# Patient Record
Sex: MALE | Race: WHITE | NOT HISPANIC OR LATINO | Employment: FULL TIME | URBAN - NONMETROPOLITAN AREA
[De-identification: names, ages, dates, MRNs, and addresses within clinical notes are randomized per-mention and may not be internally consistent; named-entity substitution may affect disease eponyms.]

---

## 2019-08-07 ENCOUNTER — TRANSCRIBE ORDERS (OUTPATIENT)
Dept: OCCUPATIONAL THERAPY | Facility: HOSPITAL | Age: 29
End: 2019-08-07

## 2019-08-07 DIAGNOSIS — Z98.890 HISTORY OF CARPAL TUNNEL RELEASE: Primary | ICD-10-CM

## 2019-08-09 ENCOUNTER — HOSPITAL ENCOUNTER (OUTPATIENT)
Dept: OCCUPATIONAL THERAPY | Facility: HOSPITAL | Age: 29
Setting detail: THERAPIES SERIES
Discharge: HOME | End: 2019-08-09

## 2019-08-09 DIAGNOSIS — R29.898 LEFT HAND WEAKNESS: ICD-10-CM

## 2019-08-09 DIAGNOSIS — Z98.890 S/P CARPAL TUNNEL RELEASE: Primary | ICD-10-CM

## 2019-08-09 PROCEDURE — 97166 OT EVAL MOD COMPLEX 45 MIN: CPT | Performed by: OCCUPATIONAL THERAPIST

## 2019-08-09 NOTE — THERAPY EVALUATION
Outpatient Occupational Therapy Hand Initial Evaluation   [facility]     Patient Name: Juventino Steven  : 1990  MRN: [id number]  Today's Date: 2019       Visit Date: 2019    Patient Active Problem List   Diagnosis   • Episodic paroxysmal hemicrania, not intractable        Past Medical History:   Diagnosis Date   • Anxiety    • Decreased libido    • Depression    • GERD (gastroesophageal reflux disease)    • Muscle, ligament, or fascia disorder    • Tobacco abuse disorder         Past Surgical History:   Procedure Laterality Date   • ADENOIDECTOMY     • LIPOMA EXCISION     • TONSILLECTOMY           Visit Dx:    ICD-10-CM ICD-9-CM   1. S/P carpal tunnel release Z98.890 V45.89   2. Left hand weakness R29.898 728.87       Patient History     Row Name 19 1100             History    Chief Complaint  Difficulty with daily activities;Muscle weakness;Pain  -TR      Type of Pain  Hand pain  -TR      Date Current Problem(s) Began  19  -TR      Brief Description of Current Complaint  Pt reports having carpal tunnel symptoms at the L UE for the past 5 years. He had carpal tunnel release at the L hand on  and was referred to OT due to weakness, pain and limited use of the L hand. He will be off work for at least 4 more weeks per his surgeon.   -TR      Previous treatment for THIS PROBLEM  Surgery  -TR      Surgery Date:  19  -TR      Patient/Caregiver Goals  Relieve pain;Return to work;Return to prior level of function;Improve strength;Know what to do to help the symptoms  -TR      Current Tobacco Use  No  -TR      Smoking Status  No  -TR      Patient's Rating of General Health  Fair  -TR      Hand Dominance  right-handed  -TR      Occupation/sports/leisure activities  /.  -TR      Patient seeing anyone else for problem(s)?  No  -TR      How has patient tried to help current problem?  AROM of the L hand.  -TR      What clinical tests have you had for this problem?  Nerve Conduction  "Test  -TR      Results of Clinical Tests  Carpal tunnel at B UE. Moderate damage of the L and slight damage to the R UE.   -TR      History of Previous Related Injuries  Recently had injections at the R UE for carpal tunnel.   -TR         Pain     Pain Location  Hand  -TR      Pain at Present  4  -TR      Pain at Best  2  -TR      Pain at Worst  8  -TR      Pain Frequency  Constant/continuous  -TR      Pain Description  Sharp;Burning Feels like \"hot water\" on L hand per pt.   -TR      What Performance Factors Make the Current Problem(s) WORSE?  Use of the L UE.  -TR      What Performance Factors Make the Current Problem(s) BETTER?  Rest.   -TR      Is your sleep disturbed?  No  -TR      Is medication used to assist with sleep?  No  -TR      What position do you sleep in?  Supine  -TR      Difficulties at work?  Off work for 4 more weeks.  -TR      Difficulties with ADL's?  Difficulty with FM aspects.   -TR      Difficulties with recreational activities?  Unable to perform heavy tasks.   -TR         Fall Risk Assessment    Any falls in the past year:  No  -TR      Does patient have a fear of falling  No  -TR         Services    Prior Rehab/Home Health Experiences  No  -TR      Are you currently receiving Home Health services  No  -TR      Do you plan to receive Home Health services in the near future  No  -TR         Daily Activities    Primary Language  English  -TR      How does patient learn best?  Listening  -TR      Teaching needs identified  Home Exercise Program;Management of Condition  -TR      Patient is concerned about/has problems with  Coordination;Difficulty with self care (i.e. bathing, dressing, toileting:;Flexibility;Grasping objects lifting;Performing home management (household chores, shopping, care of dependents);Performing job responsibilities/community activities (work, school,;Performing sports, recreation, and play activities;Repetitive movements of the hand, arm, shoulder;Writing/grasping " items with hand(s)  -TR      Does patient have problems with the following?  None  -TR      Barriers to learning  None  -TR      Pt Participated in POC and Goals  Yes  -TR         Safety    Are you being hurt, hit, or frightened by anyone at home or in your life?  No  -TR      Are you being neglected by a caregiver  No  -TR        User Key  (r) = Recorded By, (t) = Taken By, (c) = Cosigned By    Initials Name Provider Type    TR Erlinda Cottrell OTR/L Occupational Therapist             Hand Therapy (last 24 hours)      Hand Eval     Row Name 08/09/19 1100             Subjective Pain    Able to rate subjective pain?  yes  -TR      Pre-Treatment Pain Level  4  -TR      Post-Treatment Pain Level  5  -TR      Subjective Pain Comment  L hand and wrist.   -TR         Splint Form    Splint Type  -- Pt owns a wrist brace, currently not using.   -TR         Hand ROM Tested?    Hand ROM Tested?  -- L hand finger flexion WNL. Extension 5% impaired.   -TR         Hand  Strength     Strength Affected Side  Bilateral  -TR          Strength Right    Right Rung  2  -TR      Right  Test 1  110  -TR      Right  Test 2  119  -TR       Strength Average Right  114.5  -TR          Strength Left    Left Rung  2  -TR      Left  Test 1  17  -TR      Left  Test 2  16  -TR       Strength Average Left  16.5  -TR         Pinch Strength    Affected Side  Bilateral  -TR         Right Hand Strength - Pinch (lbs)    Lateral  16 lbs  -TR      Three Jaw Jose A  15 lbs  -TR      Tip Pinch - Index Finger  12 lbs  -TR         Left Hand Strength - Pinch (lbs)    Lateral  9 lbs  -TR      Three Jaw Jose A  6 lbs  -TR      Tip Pinch - Index Finger  3 lbs  -TR        User Key  (r) = Recorded By, (t) = Taken By, (c) = Cosigned By    Initials Name Provider Type    Erlinda Linton OTR/L Occupational Therapist          OT Neuro     Row Name 08/09/19 1100             Home Living    Living Arrangements  apartment   -TR         Vision- Basic    Current Vision  No visual deficits  -TR         Cognitive Assessment/Intervention    Current Cognitive/Communication Assessment  functional  -TR      Orientation Status (Cognition)  oriented x 4  -TR      Follows Commands (Cognition)  WNL  -TR         Sensation    Additional Comments  Slight numbness reported at surgical site. No other concerns.   -TR         Posture/Observations    Posture- WNL  Posture is WNL  -TR         Coordination    Other Coordination Observations  L hand opposition WFL.   -TR         General ROM    RT Upper Ext  Rt Wrist Flexion;Rt Wrist Extension  -TR      LT Upper Ext  Lt Wrist Flexion;Lt Wrist Extension  -TR         Right Upper Ext    Rt Wrist Flexion AROM  77  -TR      Rt Wrist Extension AROM  67  -TR         Left Upper Ext    Lt Wrist Flexion AROM  46  -TR      Lt Wrist Extension AROM  48  -TR         MMT (Manual Muscle Testing)    General MMT Comments  5/5 except L wrist 3-/5.   -TR         Functional Mobility    Functional Mobility- Ind. Level  independent  -TR         ADL Assessment/Intervention    ADL's Assessed?  Grooming;Lower Body Dressing  -TR      Comment, IADL Assessment/Training  Currently not working or performing heavy IADL tasks. Mod I for light and moderate IADL tasks.   -TR      Additional Documentation  Comment, IADL Assessment/Training (Row)  -TR         Lower Body Dressing Assessment/Training    Comment (Lower Body Dressing)  Difficulty with clothing fasteners and use of L hand for completion.   -TR         Grooming Assessment/Training    Comment (Grooming)  Difficulty opening containers and using L hand for FM aspects.   -TR        User Key  (r) = Recorded By, (t) = Taken By, (c) = Cosigned By    Initials Name Provider Type    TR Erlinda Cottrell, OTR/L Occupational Therapist              Therapy Education  Education Details: L hand tendon glides, median nerve glides and light resistance  and pinch strengthening using a ball.    Given: HEP, Symptoms/condition management, Pain management, Posture/body mechanics  Program: New  How Provided: Verbal, Demonstration, Written  Provided to: Patient  Level of Understanding: Verbalized, Demonstrated      OT Goals     Row Name 08/09/19 1100          OT Short Term Goals    STG Date to Achieve  09/08/19  -TR     STG 1  Pt will be independent with daily completion of a HEP to address L hand and wrist deficits.   -TR     STG 1 Progress  New  -TR     STG 2  Pt will display improved L hand coordination by completing the 9 hole peg assessment in 19 seconds or less.   -TR     STG 2 Progress  New  -TR     STG 3  Pt will open bottles, jars and containers with Modified [place] and no L UE pain to return to his PLOF.   -TR     STG 3 Progress  New  -TR        Long Term Goals    LTG Date to Achieve  09/08/19  -TR     LTG 1  Pt will increase L hand  strength to 80# to return to his PLOF with all IADL and work related tasks.   -TR     LTG 1 Progress  New  -TR     LTG 2  Pt will improve L wrist active flexion and extension to 60 degrees with 0/10 pain for improved use of the L UE during work tasks.   -TR     LTG 2 Progress  New  -TR     LTG 3  Pt will display independence with completion of scar massage and joint protection techniques for the L wrist and hand.   -TR     LTG 3 Progress  New  -TR        Time Calculation    OT Goal Re-Cert Due Date  09/08/19  -TR       User Key  (r) = Recorded By, (t) = Taken By, (c) = Cosigned By    Initials Name Provider Type    TR Erlinda Cottrell, OTR/L Occupational Therapist          OT Assessment/Plan     Row Name 08/09/19 1100          OT Assessment    Functional Limitations  Performance in work activities;Performance in self-care ADL;Performance in leisure activities;Limitations in functional capacity and performance;Limitation in home management  -TR     Impairments  Coordination;Dexterity;Endurance;Range of motion;Sensation;Pain;Muscle strength;Joint  mobility;Impaired muscle power;Impaired muscle endurance;Impaired flexibility  -TR     Assessment Comments  This pt is 2 weeks s/p L carpal tunnel release. His incision site is almost closed and he shows no signs of edema at the L hand. L finger extension is slightly impaired and ROM deficits are noted for wrist flexion and extension. L hand  strength is greatly impaired and the pt is currently unable to work due to his deficits. He has difficulty with use of the L hand during ADL tasks and most IADL. Skilled OT is necessary to address L UE deficits for this pt to regain full use of the L hand and return to work with no limitations.   -TR     Please refer to paper survey for additional self-reported information  Yes  -TR     OT Diagnosis  S/P Carpal Tunnel Release, L hand weakness.   -TR     OT Rehab Potential  Good  -TR     Patient/caregiver participated in establishment of treatment plan and goals  Yes  -TR     Patient would benefit from skilled therapy intervention  Yes  -TR        OT Plan    OT Frequency  1x/week;2x/week 1-2x/week   -TR     Predicted Duration of Therapy Intervention (Therapy Eval)  6 weeks  -TR     Planned CPT's?  OT EVAL MOD COMPLEXITY: 74021;OT ELECTRIC STIM ATTD EA 15 MIN: 78789;OT ULTRASOUND EA 15 MIN: 05799;OT SELF CARE/MGMT/TRAIN 15 MIN: 05450;OT THER PROC EA 15 MIN: 62298UZ;OT THER ACT EA 15 MIN: 28525NX;OT ORTHO/PROSTHET CHECKOUT EA 15 MIN: 55333;OT ORTHOTIC MGMT/TRAIN EA 15 MIN: 02716  -TR     Planned Therapy Interventions (Optional Details)  home exercise program;stretching;strengthening;ROM (Range of Motion);patient/family education;orthotic fitting/training;motor coordination training  -TR     OT Plan Comments  Plan to focus on ROM deficits and pain, then progress to strengthening and tolerance of work tasks.   -TR       User Key  (r) = Recorded By, (t) = Taken By, (c) = Cosigned By    Initials Name Provider Type    TR Erlinda Cottrell, OTR/L Occupational Therapist               Outcome Measure Options: 9 Hole Peg, Quick DASH  9 Hole Peg  9-Hole Peg Left: 21.9  9-Hole Peg Right: 16.2  Quick DASH  Open a tight or new jar.: Unable  Do heavy household chores (e.g., wash walls, wash floors): Mild Difficulty  Carry a shopping bag or briefcase: Severe Difficulty  Wash your back: No Difficulty  Use a knife to cut food: Mild Difficulty  Recreational activities in which you take some force or impact through your arm, should or hand (e.g. golf, hammering, tennis, etc.): Severe Difficulty  During the past week, to what extent has your arm, shoulder, or hand problem interfered with your normal social activites with family, friends, neighbors or groups?: Extremely  During the past week, were you limited in your work or other regular daily activities as a result of your arm, shoulder or hand problem?: Very limited  Arm, Shoulder, or hand pain: Mild  Tingling (pins and needles) in your arm, shoulder, or hand: Mild  During the past week, how much difficulty have you had sleeping because of the pain in your arm, shoulder or hand?: No difficulty  Number of Questions Answered: 11  Quick DASH Score: 47.73         Time Calculation:   OT Start Time: 1015  OT Stop Time: 1109  OT Time Calculation (min): 54 min     Therapy Charges for Today     Code Description Service Date Service Provider Modifiers Qty    96153270081  OT EVAL MOD COMPLEXITY 4 8/9/2019 Erlinda Cottrell OTR/L GO 1                 MACARIO Boland/LISS  8/9/2019

## 2019-08-12 ENCOUNTER — HOSPITAL ENCOUNTER (OUTPATIENT)
Dept: OCCUPATIONAL THERAPY | Facility: HOSPITAL | Age: 29
Setting detail: THERAPIES SERIES
Discharge: HOME | End: 2019-08-12

## 2019-08-12 DIAGNOSIS — R29.898 LEFT HAND WEAKNESS: ICD-10-CM

## 2019-08-12 DIAGNOSIS — Z98.890 S/P CARPAL TUNNEL RELEASE: Primary | ICD-10-CM

## 2019-08-12 PROCEDURE — 97530 THERAPEUTIC ACTIVITIES: CPT | Performed by: OCCUPATIONAL THERAPIST

## 2019-08-12 PROCEDURE — 97032 APPL MODALITY 1+ESTIM EA 15: CPT | Performed by: OCCUPATIONAL THERAPIST

## 2019-08-12 NOTE — THERAPY TREATMENT NOTE
Outpatient Occupational Therapy Hand Treatment Note  [facility]     Patient Name: Juventino Steven  : 1990  MRN: [id number]  Today's Date: 2019         Visit Date: 2019  Patient Active Problem List   Diagnosis   • Episodic paroxysmal hemicrania, not intractable         Visit Dx:    ICD-10-CM ICD-9-CM   1. S/P carpal tunnel release Z98.890 V45.89   2. Left hand weakness R29.898 728.87                   OT Assessment/Plan     Row Name 19 1200          OT Assessment    Assessment Comments  Pt made improvements with L hand and wrist pain following the use of laserstim, but pain increased with activity. L hand coordination remains impaired and weakness is noted during functional tasks. Incision site has almost closed. Will address scar massage at next visit.   -TR        OT Plan    OT Plan Comments  Plan to complete scar massage and address L hand pain.   -TR       User Key  (r) = Recorded By, (t) = Taken By, (c) = Cosigned By    Initials Name Provider Type    Erlinda Linton, OTR/L Occupational Therapist          Modalities     Row Name 19 1100             ELECTRICAL STIMULATION    Attended/Unattended  Attended  -TR      Stimulation Type  -- Laserstim  -TR      Max mAmp  -- acute pain setting.   -TR      Location/Electrode Placement/Other  Over L wrist surgical site and palmar aspect of L MP joints.   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 Advised pt to only complete light weight activities with L hand and wrist at this time.  -TR         Subjective Pain    Able to rate subjective pain?  yes  -TR      Pre-Treatment Pain Level  6  -TR       Post-Treatment Pain Level  6  -TR      Subjective Pain Comment  L hand and wrist.   -TR         Exercise 1    Exercise Name 1  Reviewed L hand tendon glides and nerve glides with only min cues required for proper technique. Desensitization exercises added to HEP for L wrist surgical site. Light strengthening completed with L hand using Min to Mod resistance clothes pins 20 reps x 2 sets. L wrist PROM with sustained stretch completed for flexion and extension. Purdue pegboard completed with L hand with min droppaged. Educated pt on use of a pencil and coins for L in-hand manipulation skills.   -TR        User Key  (r) = Recorded By, (t) = Taken By, (c) = Cosigned By    Initials Name Provider Type    TR Erlinda Cottrell, OTR/L Occupational Therapist          OT Goals     Row Name 08/12/19 1200          OT Short Term Goals    STG Date to Achieve  09/08/19  -TR     STG 1  Pt will be independent with daily completion of a HEP to address L hand and wrist deficits.   -TR     STG 1 Progress  Ongoing  -TR     STG 1 Progress Comments  Reviewed nerve and tendon glides. Added desensitization exercises.   -TR     STG 2  Pt will display improved L hand coordination by completing the 9 hole peg assessment in 19 seconds or less.   -TR     STG 2 Progress  Ongoing  -TR     STG 2 Progress Comments  Min droppage with the Purdue Pegboard.   -TR     STG 3  Pt will open bottles, jars and containers with Modified [place] and no L UE pain to return to his PLOF.   -TR     STG 3 Progress  Ongoing  -TR        Long Term Goals    LTG Date to Achieve  09/08/19  -TR     LTG 1  Pt will increase L hand  strength to 80# to return to his PLOF with all IADL and work related tasks.   -TR     LTG 1 Progress  Ongoing  -TR     LTG 2  Pt will improve L wrist active flexion and extension to 60 degrees with 0/10 pain for improved use of the L UE during work tasks.   -TR     LTG 2 Progress  Ongoing  -TR     LTG 2 Progress Comments  Passive  stretching completed.   -TR     LTG 3  Pt will display independence with completion of scar massage and joint protection techniques for the L wrist and hand.   -TR     LTG 3 Progress  Ongoing  -TR     LTG 3 Progress Comments  Incision site has not fully closed. Will address at next visit.   -TR        Time Calculation    OT Goal Re-Cert Due Date  09/08/19  -TR       User Key  (r) = Recorded By, (t) = Taken By, (c) = Cosigned By    Initials Name Provider Type    TR Erlinda Cottrell OTR/L Occupational Therapist          Therapy Education  Education Details: Reviewed nerve and tendon glides. Added desensitization and in-hand manipulation skills to HEP.   Given: HEP, Symptoms/condition management, Pain management  Program: Progressed  How Provided: Verbal, Demonstration, Written  Provided to: Patient  Level of Understanding: Verbalized, Demonstrated               Time Calculation:   OT Start Time: 1104  OT Stop Time: 1143  OT Time Calculation (min): 39 min  Total Timed Code Minutes- OT: 39 minute(s)     Therapy Charges for Today     Code Description Service Date Service Provider Modifiers Qty    04699587396  OT ELEC STIM EA-PER 15 MIN 8/12/2019 Erlinda Cottrell OTR/L GO 1    [id number]  OT THERAPEUTIC ACT EA 15 MIN 8/12/2019 Erlinda Cottrell OTR/LISS GO 2                  Erlinda PORTER. MERCEDES Cottrell  8/12/2019

## 2019-08-19 ENCOUNTER — HOSPITAL ENCOUNTER (OUTPATIENT)
Dept: OCCUPATIONAL THERAPY | Facility: HOSPITAL | Age: 29
Setting detail: THERAPIES SERIES
Discharge: HOME | End: 2019-08-19

## 2019-08-19 DIAGNOSIS — R29.898 LEFT HAND WEAKNESS: ICD-10-CM

## 2019-08-19 DIAGNOSIS — Z98.890 S/P CARPAL TUNNEL RELEASE: Primary | ICD-10-CM

## 2019-08-19 PROCEDURE — 97032 APPL MODALITY 1+ESTIM EA 15: CPT | Performed by: OCCUPATIONAL THERAPIST

## 2019-08-19 PROCEDURE — 97110 THERAPEUTIC EXERCISES: CPT | Performed by: OCCUPATIONAL THERAPIST

## 2019-08-19 NOTE — THERAPY TREATMENT NOTE
Outpatient Occupational Therapy Hand Treatment Note  [facility]     Patient Name: Juventino Steven  : 1990  MRN: [id number]  Today's Date: 2019         Visit Date: 2019  Patient Active Problem List   Diagnosis   • Episodic paroxysmal hemicrania, not intractable         Visit Dx:    ICD-10-CM ICD-9-CM   1. S/P carpal tunnel release Z98.890 V45.89   2. Left hand weakness R29.898 728.87          Hand Therapy (last 24 hours)      Hand Eval     Row Name 19 1100              Strength Left    # Reps  -- 9/10 pain reported with testing.   -TR      Left Rung  2  -TR      Left  Test 1  41  -TR      Left  Test 2  42  -TR      Left  Test 3  51  -TR       Strength Average Left  44.67  -TR         Left Hand Strength - Pinch (lbs)    Lateral  13 lbs  -TR      Three Jaw Jose A  9 lbs  -TR      Tip Pinch - Index Finger  8 lbs  -TR        User Key  (r) = Recorded By, (t) = Taken By, (c) = Cosigned By    Initials Name Provider Type    Erlinda Linton, OTR/L Occupational Therapist                  OT Assessment/Plan     Row Name 19 1100          OT Assessment    Assessment Comments  Pt met his coordination goal today with the 9 hole peg assessment. His L hand  and pinch strength continues to increase. Pain is improving, but increases with gripping with the L hand. No concerns with HEP. Dry skin noted around surgical site, so massage was not completed. Will attempt at next session is scar is closed with no concerns.   -TR        OT Plan    OT Plan Comments  Plan to focus on L hand pain, wrist AROM and L hand strength.   -TR       User Key  (r) = Recorded By, (t) = Taken By, (c) = Cosigned By    Initials Name Provider Type    Erlinda Linton, OTR/L Occupational Therapist          Modalities     Row Name 19 1100             ELECTRICAL STIMULATION    Attended/Unattended  Attended  -TR      Stimulation Type  -- Laserstim  -TR      Max mAmp  -- Acute pain setting.   -TR       Location/Electrode Placement/Other  Over L wrist surgical site and palmar aspect of L MP joints.   -TR      84909 - OT E-Stim Attended (Manual) Minutes  15  -TR        User Key  (r) = Recorded By, (t) = Taken By, (c) = Cosigned By    Initials Name Provider Type    Erlinda Linton OTR/L Occupational Therapist        OT Exercises     Row Name 08/19/19 1100             Subjective Comments    Subjective Comments  Pt reports daily completion of his HEP and only doing light housework with his L hand. Pain is slowly improving per pt.   -TR         Subjective Pain    Able to rate subjective pain?  yes  -TR      Pre-Treatment Pain Level  3  -TR      Post-Treatment Pain Level  4  -TR      Subjective Pain Comment  L hand. Sore and feels swollen.   -TR         Exercise 1    Exercise Name 1  7# digi-flex used for L hand  strengthening 20 reps x 1 set. 1.5# digi-flex used for isolated finger flexion strengthening at L hand 20 x 1. 6# of resistance used for L hand finger extension strengthening 20 x 1. Hand exercise web used for L wrist flexion and extension strengthening 20 reps x 1 set. Slight increase in pain with exercise. Scar massage not attempted at surgical site due to dry skin still intact.   -TR        User Key  (r) = Recorded By, (t) = Taken By, (c) = Cosigned By    Initials Name Provider Type    Erlinda Linton, OTR/L Occupational Therapist          OT Goals     Row Name 08/19/19 1100          OT Short Term Goals    STG Date to Achieve  09/08/19  -TR     STG 1  Pt will be independent with daily completion of a HEP to address L hand and wrist deficits.   -TR     STG 1 Progress  Progressing  -TR     STG 1 Progress Comments  Pt reports daily completion. No concerns noted with current HEP.   -TR     STG 2  Pt will display improved L hand coordination by completing the 9 hole peg assessment in 19 seconds or less.   -TR     STG 2 Progress  Met  -TR     STG 2 Progress Comments  18.7 seconds.   -TR     STG 3   Pt will open bottles, jars and containers with Modified [place] and no L UE pain to return to his PLOF.   -TR     STG 3 Progress  Ongoing  -TR     STG 3 Progress Comments  L  strengthening completed.   -TR        Long Term Goals    LTG Date to Achieve  09/08/19  -TR     LTG 1  Pt will increase L hand  strength to 80# to return to his PLOF with all IADL and work related tasks.   -TR     LTG 1 Progress  Progressing  -TR     LTG 1 Progress Comments  Improved to 44#.   -TR     LTG 2  Pt will improve L wrist active flexion and extension to 60 degrees with 0/10 pain for improved use of the L UE during work tasks.   -TR     LTG 2 Progress  Ongoing  -TR     LTG 2 Progress Comments  3/10 pain at rest.   -TR     LTG 3  Pt will display independence with completion of scar massage and joint protection techniques for the L wrist and hand.   -TR     LTG 3 Progress  Ongoing  -TR     LTG 3 Progress Comments  Surgical scar is healing, but not completely closed.   -TR        Time Calculation    OT Goal Re-Cert Due Date  09/08/19  -TR       User Key  (r) = Recorded By, (t) = Taken By, (c) = Cosigned By    Initials Name Provider Type    TR Erlinda Cottrell, OTR/L Occupational Therapist          Therapy Education  Education Details: No concerns noted with tendon and nerve glides. Educated pt on scar massage, but did not complete due to dry skin around the scar.   Given: HEP, Symptoms/condition management  Program: Reinforced  How Provided: Verbal, Demonstration  Provided to: Patient  Level of Understanding: Verbalized, Demonstrated     9 Hole Peg  9-Hole Peg Left: 18.7         Time Calculation:   OT Start Time: 0802  OT Stop Time: 0847  OT Time Calculation (min): 45 min  Total Timed Code Minutes- OT: 45 minute(s)     Therapy Charges for Today     Code Description Service Date Service Provider Modifiers Qty    [id number]  OT THER PROC EA 15 MIN 8/19/2019 Erlinda Cottrell OTR/L GO 2    [id number]  OT ELEC STIM  EA-PER 15 MIN 8/19/2019 Erlinda Cottrell, OTR/L GO 1                  Erlinda PORTER. Simba, OTR/L  8/19/2019

## 2019-08-26 ENCOUNTER — HOSPITAL ENCOUNTER (OUTPATIENT)
Dept: OCCUPATIONAL THERAPY | Facility: HOSPITAL | Age: 29
Setting detail: THERAPIES SERIES
Discharge: HOME | End: 2019-08-26

## 2019-08-26 DIAGNOSIS — Z98.890 S/P CARPAL TUNNEL RELEASE: Primary | ICD-10-CM

## 2019-08-26 DIAGNOSIS — R29.898 LEFT HAND WEAKNESS: ICD-10-CM

## 2019-08-26 PROCEDURE — 97110 THERAPEUTIC EXERCISES: CPT | Performed by: OCCUPATIONAL THERAPIST

## 2019-08-26 PROCEDURE — 97032 APPL MODALITY 1+ESTIM EA 15: CPT | Performed by: OCCUPATIONAL THERAPIST

## 2019-08-26 PROCEDURE — 97530 THERAPEUTIC ACTIVITIES: CPT | Performed by: OCCUPATIONAL THERAPIST

## 2019-08-26 NOTE — THERAPY TREATMENT NOTE
Outpatient Occupational Therapy Hand Treatment Note  [facility]     Patient Name: Juventino Steven  : 1990  MRN: [id number]  Today's Date: 2019         Visit Date: 2019  Patient Active Problem List   Diagnosis   • Episodic paroxysmal hemicrania, not intractable         Visit Dx:    ICD-10-CM ICD-9-CM   1. S/P carpal tunnel release Z98.890 V45.89   2. Left hand weakness R29.898 728.87                   OT Assessment/Plan     Row Name 19 1000          OT Assessment    Assessment Comments  Pt reports pain 6/10 pre and post tx. pt has notable grimmacing with tasks and sensitivity at incision site. Pt reports no increase in pain, educated on importance of accurate pain reporting. Scar massage and laser stim txs completed at surgical site, with no reports of pain at other sites. Pt completed nerve gliding and light strengthening/opposition activities with verbal cues. Pt reports overuse this weekend with notable swelling, still some swelling to note this date. Pt is eager to return to work.  -MM        OT Plan    OT Plan Comments  Plan to complete scar massage and ROM, while managing pain.  -MM       User Key  (r) = Recorded By, (t) = Taken By, (c) = Cosigned By    Initials Name Provider Type    Iam Sanchez, OTR/L Occupational Therapist          Modalities     Row Name 19 1007             ELECTRICAL STIMULATION    Attended/Unattended  Attended  -MM      Stimulation Type  -- laserstim  -MM      Max mAmp  -- acute pain setting  -MM      Location/Electrode Placement/Other  Over L wrist surgical site  -MM      29611 - OT E-Stim Attended (Manual) Minutes  10  -MM        User Key  (r) = Recorded By, (t) = Taken By, (c) = Cosigned By    Initials Name Provider Type    Iam Sanchez, OTR/L Occupational Therapist        OT Exercises     Row Name 19 1007             Subjective Comments    Subjective Comments  Pt reports follow through with HEP. Pt reports overworking L hand this  weekend with notable swelling this weekend. Pt reports swelling is still present but improved from weekend.  -MM         Subjective Pain    Able to rate subjective pain?  yes  -MM      Pre-Treatment Pain Level  6  -MM      Post-Treatment Pain Level  6  -MM      Subjective Pain Comment  L hand. Sore and swollen. Grimmaces throughout session. Discussed importance of accurately reporting pain.  -MM         Functional Mobility    Functional Mobility- Ind. Level  independent  -MM         Total Minutes    83476 - OT Therapeutic Activity Minutes  15  -MM         Exercise 1    Exercise Name 1  Reviewed and completed L hand nerve gliding technique with min to mod verbal cues at times for techniques. Completed light stretching and opposition exercise to L hand. Completed light scar massage with lotion. Pt has notable grimmacing with most tasks but reports pain remains a 6. Sensitivity noted at surgical site.   -MM        User Key  (r) = Recorded By, (t) = Taken By, (c) = Cosigned By    Initials Name Provider Type    MM Iam Kirkpatrick, OTR/L Occupational Therapist          OT Goals     Row Name 08/26/19 1007          OT Short Term Goals    STG Date to Achieve  09/08/19  -MM     STG 1  Pt will be independent with daily completion of a HEP to address L hand and wrist deficits.   -MM     STG 1 Progress  Progressing  -MM     STG 1 Progress Comments  Reports completion at home, review this date.  -MM     STG 2  Pt will display improved L hand coordination by completing the 9 hole peg assessment in 19 seconds or less.   -MM     STG 2 Progress  Met  -MM     STG 3  Pt will open bottles, jars and containers with Modified [place] and no L UE pain to return to his PLOF.   -MM     STG 3 Progress  Ongoing  -MM     STG 3 Progress Comments  Strengthening and ROM completed to L hand.  -MM        Long Term Goals    LTG Date to Achieve  09/08/19  -MM     LTG 1  Pt will increase L hand  strength to 80# to return to his PLOF with all  IADL and work related tasks.   -MM     LTG 1 Progress  Progressing  -MM     LTG 2  Pt will improve L wrist active flexion and extension to 60 degrees with 0/10 pain for improved use of the L UE during work tasks.   -MM     LTG 2 Progress  Ongoing  -MM     LTG 2 Progress Comments  6/10 pain throughout this date.  -MM     LTG 3  Pt will display independence with completion of scar massage and joint protection techniques for the L wrist and hand.   -MM     LTG 3 Progress  Ongoing  -MM     LTG 3 Progress Comments  Notable scar healing this date. Mild swelling noted at surgical site.  -MM        Time Calculation    OT Goal Re-Cert Due Date  09/08/19  -MM       User Key  (r) = Recorded By, (t) = Taken By, (c) = Cosigned By    Initials Name Provider Type    Iam Sanchez OTR/L Occupational Therapist          Therapy Education  Education Details: No concerns with HEP. Discussed swelling and overwork to surgical site.  Given: HEP, Symptoms/condition management  Program: Reinforced  How Provided: Verbal, Demonstration  Provided to: Patient  Level of Understanding: Verbalized, Demonstrated               Time Calculation:   OT Start Time: 0850  OT Stop Time: 0930  OT Time Calculation (min): 40 min  Total Timed Code Minutes- OT: 40 minute(s)     Therapy Charges for Today     Code Description Service Date Service Provider Modifiers Qty    [id number]  OT THER PROC EA 15 MIN 8/26/2019 Iam Kirkpatrick OTR/L GO 1    [id number]  OT THERAPEUTIC ACT EA 15 MIN 8/26/2019 Iam Kirkpatrick OTR/L GO 1    [id number]  OT ELEC STIM EA-PER 15 MIN 8/26/2019 Iam Kirkpatrick OTR/L GO 1                  MACARIO Anne/LISS  8/26/2019

## 2019-09-04 ENCOUNTER — TREATMENT (OUTPATIENT)
Dept: PHYSICAL THERAPY | Facility: CLINIC | Age: 29
End: 2019-09-04

## 2019-09-04 DIAGNOSIS — R29.898 LEFT HAND WEAKNESS: ICD-10-CM

## 2019-09-04 DIAGNOSIS — Z98.890 S/P CARPAL TUNNEL RELEASE: Primary | ICD-10-CM

## 2019-09-04 PROCEDURE — 97110 THERAPEUTIC EXERCISES: CPT | Performed by: OCCUPATIONAL THERAPIST

## 2019-09-04 NOTE — PROGRESS NOTES
Outpatient Occupational Therapy Hand Progress Note        Patient Name: Juventino Steven  : 1990  MRN: [id number]  Today's Date: 2019       Visit Date: 2019    Patient Active Problem List   Diagnosis   • Episodic paroxysmal hemicrania, not intractable        Past Medical History:   Diagnosis Date   • Anxiety    • Decreased libido    • Depression    • GERD (gastroesophageal reflux disease)    • Muscle, ligament, or fascia disorder    • Tobacco abuse disorder         Past Surgical History:   Procedure Laterality Date   • ADENOIDECTOMY     • LIPOMA EXCISION     • TONSILLECTOMY           Visit Dx:    ICD-10-CM ICD-9-CM   1. S/P carpal tunnel release Z98.890 V45.89   2. Left hand weakness R29.898 728.87              Hand Therapy (last 24 hours)      Hand Eval     Row Name 19 1700              Strength Left    Left Rung  2  -TR      Left  Test 1  51  -TR      Left  Test 2  63  -TR      Left  Test 3  42  -TR       Strength Average Left  52  -TR         Left Hand Strength - Pinch (lbs)    Lateral  13 lbs  -TR      Three Jaw Jose A  10 lbs  -TR      Tip Pinch - Index Finger  9 lbs  -TR        User Key  (r) = Recorded By, (t) = Taken By, (c) = Cosigned By    Initials Name Provider Type    TR Erlinda Cottrell, OTR/L Occupational Therapist          OT Neuro     Row Name 19 1700             Cognitive Assessment/Intervention    Current Cognitive/Communication Assessment  functional  -TR      Orientation Status (Cognition)  oriented x 4  -TR      Follows Commands (Cognition)  WNL  -TR         Sensation    Additional Comments  Slight numbness reported at surgical site with hypersensitivity surrounding the scar.   -TR         Coordination    Coordination WNL?  WNL  -TR         Left Upper Ext    Lt Wrist Flexion AROM  75  -TR      Lt Wrist Extension AROM  53  -TR         MMT (Manual Muscle Testing)    General MMT Comments  L wrist 4/5. L elbow/shoulder 5/5.   -TR         ADL  Assessment/Intervention    ADL's Assessed?  -- Mod I with all ADL.   -TR      Comment, IADL Assessment/Training  Has not returned to work. Independent with light IADL. Min A for Moderate to Heavy IADL.   -TR        User Key  (r) = Recorded By, (t) = Taken By, (c) = Cosigned By    Initials Name Provider Type    Erlinda Linton, OTR/L Occupational Therapist              Therapy Education  Education Details: Use of theraband for resisted  L wrist strengthening. Isometric L wrist strengthening and use of theraputty for L hand  strengthening.   Given: HEP, Symptoms/condition management  Program: Progressed  How Provided: Verbal, Demonstration, Written  Provided to: Patient  Level of Understanding: Verbalized, Demonstrated, Teach back education performed      OT Goals     Row Name 09/04/19 1700          OT Short Term Goals    STG Date to Achieve  10/04/19  -TR     STG 1  Pt will be independent with daily completion of a HEP to address L hand and wrist deficits.   -TR     STG 1 Progress  Progressing  -TR     STG 1 Progress Comments  HEP progressed today. Daily completion reported.   -TR     STG 2  Pt will display improved L hand coordination by completing the 9 hole peg assessment in 19 seconds or less.   -TR     STG 2 Progress  Met  -TR     STG 3  Pt will open bottles, jars and containers with Modified [place] and no L UE pain to return to his PLOF.   -TR     STG 3 Progress  Progressing  -TR     STG 3 Progress Comments  Moderate difficulty with occasional Min A required.   -TR        Long Term Goals    LTG Date to Achieve  10/04/19  -TR     LTG 1  Pt will increase L hand  strength to 80# to return to his PLOF with all IADL and work related tasks.   -TR     LTG 1 Progress  Progressing  -TR     LTG 1 Progress Comments  52#.   -TR     LTG 2  Pt will improve L wrist active flexion and extension to 60 degrees with 0/10 pain for improved use of the L UE during work tasks.   -TR     LTG 2 Progress  Progressing   -TR     LTG 2 Progress Comments  Flexion 75, Extension 53. 4/10 pain.   -TR     LTG 3  Pt will display independence with completion of scar massage and joint protection techniques for the L wrist and hand.   -TR     LTG 3 Progress  Progressing  -TR     LTG 3 Progress Comments  Education ongoing. Will continue to address prior to return to work. L surgical site hypersensitive.   -TR        Time Calculation    OT Goal Re-Cert Due Date  10/04/19  -TR       User Key  (r) = Recorded By, (t) = Taken By, (c) = Cosigned By    Initials Name Provider Type    TR Erlinda Cottrell, OTR/L Occupational Therapist          OT Assessment/Plan     Row Name 09/04/19 1700          OT Assessment    Functional Limitations  Performance in work activities;Performance in leisure activities;Limitations in functional capacity and performance;Limitation in home management  -TR     Impairments  Endurance;Range of motion;Sensation;Pain;Muscle strength;Joint mobility;Impaired muscle power;Impaired muscle endurance;Impaired flexibility  -TR     Assessment Comments  Pt has made gains with all goals and deficits since the start of care. L wrist extension is limited and hand  strength is impaired. Pt drops items often and is hypersensitive at the surgical site. He is hoping to return to work next week and will follow up with his surgeon before he does. Pain continues to be a barrier to progress and performance of IADL tasks. OT will continue to address these deficits for pt to return to work with no limitations.   -TR     Please refer to paper survey for additional self-reported information  No  -TR     OT Diagnosis  S/P Carpal Tunnel Release, L hand weakness.   -TR     OT Rehab Potential  Good  -TR     Patient/caregiver participated in establishment of treatment plan and goals  Yes  -TR     Patient would benefit from skilled therapy intervention  Yes  -TR        OT Plan    OT Frequency  1x/week;2x/week 1-2x/week  -TR     Predicted Duration of  Therapy Intervention (Therapy Eval)  4 weeks   -TR     Planned CPT's?  OT ELECTRIC STIM ATTD EA 15 MIN: 75988;OT ULTRASOUND EA 15 MIN: 72811;OT SELF CARE/MGMT/TRAIN 15 MIN: 24550;OT THER PROC EA 15 MIN: 36480NR;OT THER ACT EA 15 MIN: 80468RH;OT ORTHO/PROSTHET CHECKOUT EA 15 MIN: 09863;OT ORTHOTIC MGMT/TRAIN EA 15 MIN: 44693  -TR     Planned Therapy Interventions (Optional Details)  home exercise program;stretching;strengthening;ROM (Range of Motion);patient/family education;orthotic fitting/training;motor coordination training  -TR     OT Plan Comments  Plan to simulate work tasks at next session and review HEP.   -TR       User Key  (r) = Recorded By, (t) = Taken By, (c) = Cosigned By    Initials Name Provider Type    Erlinda Linton OTR/L Occupational Therapist          OT Exercises     Row Name 09/04/19 1700             Subjective Comments    Subjective Comments  Pt reports he will see his surgeon on 9/11 and hopes to return to work 9/12.   -TR         Subjective Pain    Able to rate subjective pain?  yes  -TR      Pre-Treatment Pain Level  4  -TR      Post-Treatment Pain Level  6  -TR      Subjective Pain Comment  L hand soreness.   -TR         Exercise 1    Exercise Name 1  Attempted scar massage, but pt was too sensitive to tolerate use of vibration tool. Educated pt on desensitization for the scar. L hand  strengthening completed using theraputty. This was added to HEP. Isometric L wrist strengthening completed in all planes. Theraband issued to pt and used for resisted L wrist AROM in all planes.   -TR        User Key  (r) = Recorded By, (t) = Taken By, (c) = Cosigned By    Initials Name Provider Type    Erlinda Linton OTR/L Occupational Therapist          Quick DASH  Open a tight or new jar.: Severe Difficulty  Do heavy household chores (e.g., wash walls, wash floors): No Difficulty  Carry a shopping bag or briefcase: Moderate Difficulty  Wash your back: No Difficulty  Use a knife to cut  food: Mild Difficulty  Recreational activities in which you take some force or impact through your arm, should or hand (e.g. golf, hammering, tennis, etc.): Moderate Difficulty  During the past week, to what extent has your arm, shoulder, or hand problem interfered with your normal social activites with family, friends, neighbors or groups?: Quite a bit  During the past week, were you limited in your work or other regular daily activities as a result of your arm, shoulder or hand problem?: Moderately Limited  Arm, Shoulder, or hand pain: Mild  Tingling (pins and needles) in your arm, shoulder, or hand: None  During the past week, how much difficulty have you had sleeping because of the pain in your arm, shoulder or hand?: Mild Difficulty  Number of Questions Answered: 11  Quick DASH Score: 34.09  Quick Dash Comments: L hand 34.09% impaired.          Time Calculation:   Total Timed Code Minutes- OT: 43 minute(s)       Timed:  Manual Therapy:         mins  42601;  Therapeutic Exercise:    43     mins  42438;     Neuromuscular [place]:        mins  78856;    Therapeutic Activity:          mins  33074;     Self Care Mgt:          mins  07480;     Ultrasound:          mins  38932;    Electrical Stimulation:         mins  61575 ( );    Untimed:  Electrical Stimulation:         mins  50816 ( );     Timed Treatment:   43   mins   Total Treatment:     43   mins             MACARIO Boland/LISS  9/4/2019

## 2019-09-10 ENCOUNTER — TREATMENT (OUTPATIENT)
Dept: PHYSICAL THERAPY | Facility: CLINIC | Age: 29
End: 2019-09-10

## 2019-09-10 DIAGNOSIS — R29.898 LEFT HAND WEAKNESS: ICD-10-CM

## 2019-09-10 DIAGNOSIS — Z98.890 S/P CARPAL TUNNEL RELEASE: Primary | ICD-10-CM

## 2019-09-10 PROCEDURE — 97530 THERAPEUTIC ACTIVITIES: CPT | Performed by: OCCUPATIONAL THERAPIST

## 2019-09-10 PROCEDURE — 97110 THERAPEUTIC EXERCISES: CPT | Performed by: OCCUPATIONAL THERAPIST

## 2019-09-10 NOTE — PROGRESS NOTES
Outpatient Occupational Therapy Ortho Treatment Note       Patient Name: Juventino Steven  : 1990  MRN: [id number]  Today's Date: 9/10/2019        Visit Date: 09/10/2019    Patient Active Problem List   Diagnosis   • Episodic paroxysmal hemicrania, not intractable        Past Medical History:   Diagnosis Date   • Anxiety    • Decreased libido    • Depression    • GERD (gastroesophageal reflux disease)    • Muscle, ligament, or fascia disorder    • Tobacco abuse disorder         Past Surgical History:   Procedure Laterality Date   • ADENOIDECTOMY     • LIPOMA EXCISION     • TONSILLECTOMY           Visit Dx:    ICD-10-CM ICD-9-CM   1. S/P carpal tunnel release Z98.890 V45.89   2. Left hand weakness R29.898 728.87                   Therapy Education  Education Details: Educated pt on use of an edema glove for L hand swelling.   Given: HEP, Symptoms/condition management, Pain management, Posture/body mechanics, Edema management  Program: Reinforced  How Provided: Verbal, Demonstration  Provided to: Patient  Level of Understanding: Verbalized    OT Assessment/Plan     Row Name 09/10/19 1200          OT Assessment    Assessment Comments  Increased L hand edema noted today. 4/10 pain at rest in L wrist/hand. This increased to 5/10 during exercise and simulated work tasks. Pt will see his surgeon this week and hopes to return to work. No additional visits scheduled at this time. Will follow up with pt after he sees the surgeon. Pt would benefit from continued therapy due to L hand pain, weakness and edema.   -TR        OT Plan    OT Plan Comments  Will speak with pt via phone after he sees his surgeon regarding continued care or return to work.   -TR       User Key  (r) = Recorded By, (t) = Taken By, (c) = Cosigned By    Initials Name Provider Type    Erlinda Linton, OTR/L Occupational Therapist           OT Goals     Row Name 09/10/19 1200          OT Short Term Goals    STG Date to Achieve  10/04/19  -TR     STG  1  Pt will be independent with daily completion of a HEP to address L hand and wrist deficits.   -TR     STG 1 Progress  Met  -TR     STG 1 Progress Comments  HEP goal met today.   -TR     STG 2  Pt will display improved L hand coordination by completing the 9 hole peg assessment in 19 seconds or less.   -TR     STG 2 Progress  Met  -TR     STG 3  Pt will open bottles, jars and containers with Modified [place] and no L UE pain to return to his PLOF.   -TR     STG 3 Progress  Progressing  -TR     STG 3 Progress Comments  4-5/10 pain during simulated tasks today.   -TR        Long Term Goals    LTG Date to Achieve  10/04/19  -TR     LTG 1  Pt will increase L hand  strength to 80# to return to his PLOF with all IADL and work related tasks.   -TR     LTG 1 Progress  Progressing  -TR     LTG 1 Progress Comments  Strengthening completed with 5/10 pain reported.   -TR     LTG 2  Pt will improve L wrist active flexion and extension to 60 degrees with 0/10 pain for improved use of the L UE during work tasks.   -TR     LTG 2 Progress  Progressing  -TR     LTG 2 Progress Comments  4-5/10 pain during simulated work tasks with L wrist.   -TR     LTG 3  Pt will display independence with completion of scar massage and joint protection techniques for the L wrist and hand.   -TR     LTG 3 Progress  Progressing  -TR     LTG 3 Progress Comments  Reviewed scar massage. Education provided on edema massage.   -TR        Time Calculation    OT Goal Re-Cert Due Date  10/04/19  -TR       User Key  (r) = Recorded By, (t) = Taken By, (c) = Cosigned By    Initials Name Provider Type    Erlinda Linton, OTR/L Occupational Therapist            OT Exercises     Row Name 09/10/19 1200             Subjective Comments    Subjective Comments  Pt reports he will see his surgeon this week and hopes to return to work by the end of the week. He thinks he slept on his L hand wrong and woke up with it swollen yesterday. Swelling noted along  L thenar and hypothenar eminences today.   -TR         Subjective Pain    Able to rate subjective pain?  yes  -TR      Pre-Treatment Pain Level  4  -TR      Post-Treatment Pain Level  5  -TR      Subjective Pain Comment  L hand and wrist.   -TR         Exercise 1    Exercise Name 1  Edema massage completed at L hand with education of pt. Pt also educated on edema reduction techniques, positioning and use of an edema glove. Pt completed 30 Min to Max resistance clothes pins with L hand  x 2 sets with min difficulty. A velcro board was completed with L hand focusing on AROM in all planes of the wrist 10 reps x 7 sets. 8# of resistance was used for L hand finger flexion and extension 20 reps x 2 sets. Medium resistance flex-bar used in all planes of the L wrist and pronation/supination 20 reps x 1 set. Pt was able to lift and carry a 20 and 30# box using B hands with 5/10 pain. 2-3# hand weights were used simulating work tasks with L UE with 5/10 pain reported.   -TR        User Key  (r) = Recorded By, (t) = Taken By, (c) = Cosigned By    Initials Name Provider Type    TR Erlinda Cottrell OTR/L Occupational Therapist                        Time Calculation:   Total Timed Code Minutes- OT: 43 minute(s)       Timed:  Manual Therapy:         mins  85282;  Therapeutic Exercise:    25     mins  95258;     Neuromuscular [place]:        mins  99288;    Therapeutic Activity:     18     mins  88540;     Self Care Mgt:          mins  05107;     Ultrasound:          mins  62725;    Electrical Stimulation:         mins  59976 ( );    Untimed:  Electrical Stimulation:         mins  62547 ( );     Timed Treatment:   43   mins   Total Treatment:     43   mins                MACARIO Boland/LISS  9/10/2019

## 2019-09-24 ENCOUNTER — DOCUMENTATION (OUTPATIENT)
Dept: PHYSICAL THERAPY | Facility: CLINIC | Age: 29
End: 2019-09-24

## 2019-09-24 DIAGNOSIS — Z98.890 S/P CARPAL TUNNEL RELEASE: Primary | ICD-10-CM

## 2019-09-24 DIAGNOSIS — R29.898 LEFT HAND WEAKNESS: ICD-10-CM

## 2019-09-24 NOTE — PROGRESS NOTES
Outpatient Occupational Therapy Discharge Summary         Patient Name: Juventino Steven  : 1990  MRN: [id number]    Today's Date: 2019      Visit Date: 2019      OT Goals     Row Name 19 1300          OT Short Term Goals    STG Date to Achieve  10/04/19  -TR     STG 1  Pt will be independent with daily completion of a HEP to address L hand and wrist deficits.   -TR     STG 1 Progress  Met  -TR     STG 2  Pt will display improved L hand coordination by completing the 9 hole peg assessment in 19 seconds or less.   -TR     STG 2 Progress  Met  -TR     STG 3  Pt will open bottles, jars and containers with Modified [place] and no L UE pain to return to his PLOF.   -TR     STG 3 Progress  Not Met  -TR     STG 3 Progress Comments  Continued L hand pain at last tx sesssion.   -TR        Long Term Goals    LTG Date to Achieve  10/04/19  -TR     LTG 1  Pt will increase L hand  strength to 80# to return to his PLOF with all IADL and work related tasks.   -TR     LTG 1 Progress  Not Met  -TR     LTG 2  Pt will improve L wrist active flexion and extension to 60 degrees with 0/10 pain for improved use of the L UE during work tasks.   -TR     LTG 2 Progress  Not Met  -TR     LTG 3  Pt will display independence with completion of scar massage and joint protection techniques for the L wrist and hand.   -TR     LTG 3 Progress  Met  -TR       User Key  (r) = Recorded By, (t) = Taken By, (c) = Cosigned By    Initials Name Provider Type    Erlinda Linton OTR/L Occupational Therapist                  Time Calculation:       Pt has returned to work and has not attempted to schedule any more tx sessions. OT will d/c per pt request.                   MACARIO Boland/LISS   2019